# Patient Record
Sex: FEMALE | Race: WHITE | Employment: FULL TIME | URBAN - METROPOLITAN AREA
[De-identification: names, ages, dates, MRNs, and addresses within clinical notes are randomized per-mention and may not be internally consistent; named-entity substitution may affect disease eponyms.]

---

## 2021-04-23 ENCOUNTER — TELEPHONE (OUTPATIENT)
Dept: ORTHOPEDIC SURGERY | Age: 61
End: 2021-04-23

## 2021-04-23 NOTE — TELEPHONE ENCOUNTER
Called Dr. kAhil Maguire office ([facility]) and left message stating this patient would like to establish care with Dr. Shanae Fortune. Apparently records have been faxed 3 times, but there is nothing scanned in, yet, and nothing in our previous fax baskets. Left office contact information, including two fax numbers, nurse direct number, general office number, and nurse direct secure email address.

## 2021-04-23 NOTE — TELEPHONE ENCOUNTER
Patient had a knee surgery with DR Rockwell and wants to establish care with Dr Magen Neely for ongoing knee pain. Per patient [facility] office told her they have faxed her records to our office 3x. I did verify fax # with patient. She is requesting a return call once we receive her records.    Thank you

## 2021-04-26 NOTE — TELEPHONE ENCOUNTER
Patient dropped off referral and medical documents will have  review 4/27/21. Then will up date the patient once  reviews.

## 2021-05-07 DIAGNOSIS — M25.561 ACUTE PAIN OF RIGHT KNEE: Primary | ICD-10-CM

## 2021-05-11 ENCOUNTER — OFFICE VISIT (OUTPATIENT)
Dept: ORTHOPEDIC SURGERY | Age: 61
End: 2021-05-11
Payer: COMMERCIAL

## 2021-05-11 DIAGNOSIS — M25.561 ACUTE PAIN OF RIGHT KNEE: Primary | ICD-10-CM

## 2021-05-11 DIAGNOSIS — M17.11 ARTHRITIS OF RIGHT KNEE: ICD-10-CM

## 2021-05-11 PROCEDURE — 99203 OFFICE O/P NEW LOW 30 MIN: CPT | Performed by: ORTHOPAEDIC SURGERY

## 2021-05-11 NOTE — PROGRESS NOTES
Social History Narrative    Not on file     No past medical history on file. No past surgical history on file. No family history on file. Physical Exam:  Vitals signs and nursing note reviewed. Constitutional:       Appearance: She is well-developed. HENT:      Head: Normocephalic and atraumatic. Nose: Nose normal.   Eyes:      Conjunctiva/sclera: Conjunctivae normal.   Neck:      Musculoskeletal: Normal range of motion and neck supple. Pulmonary:      Effort: Pulmonary effort is normal. No respiratory distress. Musculoskeletal:      Comments: Normal gait     Skin:     General: Skin is warm and dry. Neurological:      Mental Status: She is alert and oriented to person, place, and time. Sensory: No sensory deficit. Psychiatric:         Behavior: Behavior normal.         Thought Content: Thought content normal.  Patient complains of medial right knee pain no obvious effusion normal range of motion good stability previous arthroscopic scars      Diagnostic imaging:    Diagnostic imaging AP standing bilateral knees lateral right knee patient with bone-on-bone medial compartment right knee normal congruent/smoothness of bone is lost endplate sclerosis marginal osteophytes    Assessment and Plan:  1. Acute pain of right knee      Patient with severe right knee osteoarthritis    She is a candidate for surgery although she is a current smoker    Explained to patient that she must quit smoking before we proceed with RTKA    Chantix prescribed    Follow up in 4 weeks      Provider Attestation:  Khurram Gill, personally performed the services described in this documentation. All medical record entries made by the scribe were at my direction and in my presence. I have reviewed the chart and discharge instructions and agree that the records reflect my personal performance and is accurate and complete. Chano Snell MD 5/11/21     Scribe Attestation:  By signing my name below, Ophelia Davis Karyn, attest that this documentation has been prepared under the direction and in the presence of Dr. Eduardo Gan. Electronically signed: Erick Suresh, 5/11/21     Please note that this chart was generated using voice recognition Dragon dictation software. Although every effort was made to ensure the accuracy of this automated transcription, some errors in transcription may have occurred.

## 2021-06-08 ENCOUNTER — OFFICE VISIT (OUTPATIENT)
Dept: ORTHOPEDIC SURGERY | Age: 61
End: 2021-06-08
Payer: COMMERCIAL

## 2021-06-08 VITALS — WEIGHT: 155 LBS | HEART RATE: 90 BPM | BODY MASS INDEX: 28.52 KG/M2 | HEIGHT: 62 IN

## 2021-06-08 DIAGNOSIS — M25.561 ACUTE PAIN OF RIGHT KNEE: Primary | ICD-10-CM

## 2021-06-08 DIAGNOSIS — M17.11 ARTHRITIS OF RIGHT KNEE: ICD-10-CM

## 2021-06-08 PROCEDURE — 99213 OFFICE O/P EST LOW 20 MIN: CPT | Performed by: ORTHOPAEDIC SURGERY

## 2021-06-08 NOTE — PROGRESS NOTES
Patient ID: Selma Mayo is a 63 y.o. female    Chief Compliant:  No chief complaint on file. HPI:  This is a 63 y.o. female who presents to the clinic today for right knee pain    She continues to have severe medial right knee pain     She reports she has completely quit nicotine products since the last visit    Review of Systems   All other systems reviewed and are negative. Past History:  No current outpatient medications on file. No Known Allergies  Social History     Socioeconomic History    Marital status:      Spouse name: Not on file    Number of children: Not on file    Years of education: Not on file    Highest education level: Not on file   Occupational History    Not on file   Tobacco Use    Smoking status: Not on file   Substance and Sexual Activity    Alcohol use: Not on file    Drug use: Not on file    Sexual activity: Not on file   Other Topics Concern    Not on file   Social History Narrative    Not on file     Social Determinants of Health     Financial Resource Strain:     Difficulty of Paying Living Expenses:    Food Insecurity:     Worried About Running Out of Food in the Last Year:     [address] in the Last Year:    Transportation Needs:     Lack of Transportation (Medical):  Lack of Transportation (Non-Medical):    Physical Activity:     Days of Exercise per Week:     Minutes of Exercise per Session:    Stress:     Feeling of Stress :    Social Connections:     Frequency of Communication with Friends and Family:     Frequency of Social Gatherings with Friends and Family:     Attends Church Services:     Active Member of Clubs or Organizations:     Attends Club or Organization Meetings:     Marital Status:    Intimate Partner Violence:     Fear of Current or Ex-Partner:     Emotionally Abused:     Physically Abused:     Sexually Abused:      No past medical history on file. No past surgical history on file.   No family history on file.     Physical Exam:  Vitals signs and nursing note reviewed. Constitutional:       Appearance: She is well-developed. HENT:      Head: Normocephalic and atraumatic. Nose: Nose normal.   Eyes:      Conjunctiva/sclera: Conjunctivae normal.   Neck:      Musculoskeletal: Normal range of motion and neck supple. Pulmonary:      Effort: Pulmonary effort is normal. No respiratory distress. Musculoskeletal:      Comments: Normal gait     Skin:     General: Skin is warm and dry. Neurological:      Mental Status: She is alert and oriented to person, place, and time. Sensory: No sensory deficit. Psychiatric:         Behavior: Behavior normal.         Thought Content: Thought content normal.        Diagnostic imaging:  Diagnostic imaging AP standing bilateral knees lateral right knee patient with bone-on-bone medial compartment right knee normal congruent/smoothness of bone is lost endplate sclerosis marginal osteophytes    Assessment and Plan:  1. Acute pain of right knee    2. Arthritis of right knee      Patient with severe right knee osteoarthritis    We will proceed with RTKA, she has completely quit nicotine products    We discussed risks of surgery and recovery process. Risks include infection, bleeding, neurologic injury, systemic and anesthetic complications, no relief of pain, need for future surgery. Follow up 2 weeks after surgery    Provider Attestation:  Orlando Gill, personally performed the services described in this documentation. All medical record entries made by the scribe were at my direction and in my presence. I have reviewed the chart and discharge instructions and agree that the records reflect my personal performance and is accurate and complete. Jimmy Barba MD 6/8/21     Scribe Attestation:  By signing my name below, Bailey Hancock, attest that this documentation has been prepared under the direction and in the presence of Dr. Anya Vásquez.  Electronically signed:

## 2021-07-06 ENCOUNTER — TELEPHONE (OUTPATIENT)
Dept: ORTHOPEDIC SURGERY | Age: 61
End: 2021-07-06

## 2021-07-06 NOTE — TELEPHONE ENCOUNTER
Kt Born from [facility] PT called stating he evaluated patient on Sunday, she is scheduled for PT through 7/17/21.

## 2021-07-06 NOTE — TELEPHONE ENCOUNTER
Taina Marti OT  Stated she evaluated patient and will have a follow up X1 for education. She verified that patient is not to shower until her PO appointment on 7/13/21, she voiced understanding to hold off on the shower until appointment.

## 2021-07-13 ENCOUNTER — TELEPHONE (OUTPATIENT)
Dept: ORTHOPEDIC SURGERY | Age: 61
End: 2021-07-13

## 2021-07-13 ENCOUNTER — OFFICE VISIT (OUTPATIENT)
Dept: ORTHOPEDIC SURGERY | Age: 61
End: 2021-07-13

## 2021-07-13 VITALS — WEIGHT: 152 LBS | BODY MASS INDEX: 26.93 KG/M2 | RESPIRATION RATE: 12 BRPM | HEIGHT: 63 IN

## 2021-07-13 DIAGNOSIS — Z96.651 STATUS POST TOTAL RIGHT KNEE REPLACEMENT: Primary | ICD-10-CM

## 2021-07-13 PROCEDURE — 99024 POSTOP FOLLOW-UP VISIT: CPT | Performed by: ORTHOPAEDIC SURGERY

## 2021-07-13 RX ORDER — ASPIRIN 81 MG/1
81 TABLET ORAL 2 TIMES DAILY
COMMUNITY
Start: 2021-06-30 | End: 2021-08-10

## 2021-07-13 RX ORDER — OXYCODONE AND ACETAMINOPHEN 10; 325 MG/1; MG/1
1 TABLET ORAL EVERY 4 HOURS PRN
COMMUNITY

## 2021-07-13 RX ORDER — VARENICLINE TARTRATE 1 MG/1
1 TABLET, FILM COATED ORAL 2 TIMES DAILY
Qty: 60 TABLET | Refills: 5 | Status: SHIPPED | OUTPATIENT
Start: 2021-07-13

## 2021-07-13 NOTE — TELEPHONE ENCOUNTER
Kev Byrne from [facility] is calling to advise Dr Nichole Greco that Ann-Mariecheri Estephania has been released from home care. She will continue with physical therapy. If he has any questions, he may reach Kev Byrne @ [phone]. Thank you.

## 2021-07-13 NOTE — PROGRESS NOTES
Patient ID: Henrry Almaguer is a 57 y.o. female    Chief Compliant:  Chief Complaint   Patient presents with    Post-Op Check     Rt TKA 21        HPI:  This is a 57 y.o. female who presents to the clinic today for right TKA on 2021. Patient notes that she is doing well post-op    She has been walking frequently and working on her ROM    She has been doing at-home PT. No denies weakness, pain, or paraesthesias      Review of Systems   All other systems reviewed and are negative. Past History:    Current Outpatient Medications:     aspirin 81 MG EC tablet, Take 81 mg by mouth 2 times daily, Disp: , Rfl:     oxyCODONE-acetaminophen (PERCOCET)  MG per tablet, Take 1 tablet by mouth every 4 hours as needed. , Disp: , Rfl:   No Known Allergies  Social History     Socioeconomic History    Marital status:      Spouse name: Not on file    Number of children: Not on file    Years of education: Not on file    Highest education level: Not on file   Occupational History    Not on file   Tobacco Use    Smoking status: Former Smoker     Quit date: 2021     Years since quittin.1    Smokeless tobacco: Never Used   Substance and Sexual Activity    Alcohol use: Not on file    Drug use: Not on file    Sexual activity: Not on file   Other Topics Concern    Not on file   Social History Narrative    Not on file     Social Determinants of Health     Financial Resource Strain:     Difficulty of Paying Living Expenses:    Food Insecurity:     Worried About [address] in the Last Year:     [address] in the Last Year:    Transportation Needs:     Lack of Transportation (Medical):      Lack of Transportation (Non-Medical):    Physical Activity:     Days of Exercise per Week:     Minutes of Exercise per Session:    Stress:     Feeling of Stress :    Social Connections:     Frequency of Communication with Friends and Family:     Frequency of Social Gatherings with below, I, Denise, attest that this documentation has been prepared under the direction and in the presence of Dr. Artemio Raymond. Electronically signed: Erick Walker, 7/13/21     Please note that this chart was generated using voice recognition Dragon dictation software. Although every effort was made to ensure the accuracy of this automated transcription, some errors in transcription may have occurred.

## 2021-08-10 ENCOUNTER — OFFICE VISIT (OUTPATIENT)
Dept: ORTHOPEDIC SURGERY | Age: 61
End: 2021-08-10

## 2021-08-10 VITALS — RESPIRATION RATE: 18 BRPM | HEIGHT: 62 IN | BODY MASS INDEX: 27.97 KG/M2 | WEIGHT: 152 LBS

## 2021-08-10 DIAGNOSIS — Z96.651 STATUS POST TOTAL RIGHT KNEE REPLACEMENT: Primary | ICD-10-CM

## 2021-08-10 PROCEDURE — 99024 POSTOP FOLLOW-UP VISIT: CPT | Performed by: ORTHOPAEDIC SURGERY

## 2021-08-10 NOTE — PROGRESS NOTES
Patient ID: Ever Mattson is a 57 y.o. female    Chief Compliant:  No chief complaint on file. Diagnostic imaging:        Assessment and Plan:  1. Status post total right knee replacement      Patient 6 weeks post RTKA with good relief of right knee pain    Follow up in     HPI:  This is a 57 y.o. female who presents to the clinic today 6 weeks post right TKA on 2021. Patient continues to recover well post surgery with good relief of knee pain. She is able to walk a mile without pain    Review of Systems   All other systems reviewed and are negative. Past History:    Current Outpatient Medications:     aspirin 81 MG EC tablet, Take 81 mg by mouth 2 times daily, Disp: , Rfl:     oxyCODONE-acetaminophen (PERCOCET)  MG per tablet, Take 1 tablet by mouth every 4 hours as needed. , Disp: , Rfl:     varenicline (CHANTIX) 1 MG tablet, Take 1 tablet by mouth 2 times daily, Disp: 60 tablet, Rfl: 5  No Known Allergies  Social History     Socioeconomic History    Marital status:      Spouse name: Not on file    Number of children: Not on file    Years of education: Not on file    Highest education level: Not on file   Occupational History    Not on file   Tobacco Use    Smoking status: Former Smoker     Quit date: 2021     Years since quittin.2    Smokeless tobacco: Never Used   Substance and Sexual Activity    Alcohol use: Not on file    Drug use: Not on file    Sexual activity: Not on file   Other Topics Concern    Not on file   Social History Narrative    Not on file     Social Determinants of Health     Financial Resource Strain:     Difficulty of Paying Living Expenses:    Food Insecurity:     Worried About [address] in the Last Year:     [address] in the Last Year:    Transportation Needs:     Lack of Transportation (Medical):      Lack of Transportation (Non-Medical):    Physical Activity:     Days of Exercise per Week:     Minutes of Exercise per Session:    Stress:     Feeling of Stress :    Social Connections:     Frequency of Communication with Friends and Family:     Frequency of Social Gatherings with Friends and Family:     Attends Protestant Services:     Active Member of Clubs or Organizations:     Attends Club or Organization Meetings:     Marital Status:    Intimate Partner Violence:     Fear of Current or Ex-Partner:     Emotionally Abused:     Physically Abused:     Sexually Abused:      No past medical history on file. No past surgical history on file. No family history on file. Physical Exam:  Vitals signs and nursing note reviewed. Constitutional:       Appearance: well-developed. HENT:      Head: Normocephalic and atraumatic. Nose: Nose normal.   Eyes:      Conjunctiva/sclera: Conjunctivae normal.   Neck:      Musculoskeletal: Normal range of motion and neck supple. Pulmonary:      Effort: Pulmonary effort is normal. No respiratory distress. Musculoskeletal:      Comments: Normal gait     Skin:     General: Skin is warm and dry. Neurological:      Mental Status: Alert and oriented to person, place, and time. Sensory: No sensory deficit. Psychiatric:         Behavior: Behavior normal.         Thought Content: Thought content normal.    Range of motion 0-1 30 excellent stability    Provider Attestation:  Shelley Gill personally performed the services described in this documentation. All medical record entries made by the scribe were at my direction and in my presence. I have reviewed the chart and discharge instructions and agree that the records reflect my personal performance and is accurate and complete. Ingrid Mcclendon MD 8/10/21     Scribe Attestation:  By signing my name below, Kirstin Rowena, attest that this documentation has been prepared under the direction and in the presence of Dr. Kashif Maynard.  Electronically signed: Erick Pina, 8/10/21     Please note that this chart was generated using voice recognition Dragon dictation software. Although every effort was made to ensure the accuracy of this automated transcription, some errors in transcription may have occurred.

## 2022-06-07 ENCOUNTER — OFFICE VISIT (OUTPATIENT)
Dept: ORTHOPEDIC SURGERY | Age: 62
End: 2022-06-07
Payer: COMMERCIAL

## 2022-06-07 VITALS — WEIGHT: 152 LBS | RESPIRATION RATE: 14 BRPM | HEIGHT: 62 IN | BODY MASS INDEX: 27.97 KG/M2

## 2022-06-07 DIAGNOSIS — Z96.651 STATUS POST TOTAL RIGHT KNEE REPLACEMENT: Primary | ICD-10-CM

## 2022-06-07 PROCEDURE — 99213 OFFICE O/P EST LOW 20 MIN: CPT | Performed by: ORTHOPAEDIC SURGERY

## 2022-06-07 RX ORDER — ALBUTEROL SULFATE 90 UG/1
2 AEROSOL, METERED RESPIRATORY (INHALATION) EVERY 6 HOURS PRN
COMMUNITY
Start: 2021-09-17

## 2022-06-07 NOTE — PROGRESS NOTES
Patient ID: Oscar Laird is a 57 y.o. female    Chief Compliant:  Chief Complaint   Patient presents with    Knee Pain     Right        Diagnostic imaging:    AP standing bilateral knees lateral right knee status post right total knee arthroplasty fairly minimal degenerative changes left knee    Assessment and Plan:  1. Status post total right knee replacement      1 year post R TKA knee pain remains improved and she is able to work and be active without pain    Follow up prn    HPI:  This is a 57 y.o. female who presents to the clinic today for 1 year post R TKA 21. Patient notes ongoing relief of right knee pain post operatively. Patient is able to work and be active without pain. She continues to walk regularly. Review of Systems   All other systems reviewed and are negative. Past History:    Current Outpatient Medications:     aspirin 81 MG EC tablet, Take 81 mg by mouth 2 times daily, Disp: , Rfl:     oxyCODONE-acetaminophen (PERCOCET)  MG per tablet, Take 1 tablet by mouth every 4 hours as needed.  (Patient not taking: Reported on 8/10/2021), Disp: , Rfl:     varenicline (CHANTIX) 1 MG tablet, Take 1 tablet by mouth 2 times daily (Patient not taking: Reported on 8/10/2021), Disp: 60 tablet, Rfl: 5  No Known Allergies  Social History     Socioeconomic History    Marital status:      Spouse name: Not on file    Number of children: Not on file    Years of education: Not on file    Highest education level: Not on file   Occupational History    Not on file   Tobacco Use    Smoking status: Former Smoker     Quit date: 2021     Years since quittin.0    Smokeless tobacco: Never Used   Substance and Sexual Activity    Alcohol use: Not on file    Drug use: Not on file    Sexual activity: Not on file   Other Topics Concern    Not on file   Social History Narrative    Not on file     Social Determinants of Health     Financial Resource Strain:     Difficulty of Paying Living Expenses: Not on file   Food Insecurity:     Worried About Running Out of Food in the Last Year: Not on file    Ran Out of Food in the Last Year: Not on file   Transportation Needs:     Lack of Transportation (Medical): Not on file    Lack of Transportation (Non-Medical): Not on file   Physical Activity:     Days of Exercise per Week: Not on file    Minutes of Exercise per Session: Not on file   Stress:     Feeling of Stress : Not on file   Social Connections:     Frequency of Communication with Friends and Family: Not on file    Frequency of Social Gatherings with Friends and Family: Not on file    Attends Denominational Services: Not on file    Active Member of [address] [place] [address] [employer] or Organizations: Not on file    Attends Club or Organization Meetings: Not on file    Marital Status: Not on file   Intimate Partner Violence:     Fear of Current or Ex-Partner: Not on file    Emotionally Abused: Not on file    Physically Abused: Not on file    Sexually Abused: Not on file   Housing Stability:     Unable to Pay for Housing in the Last Year: Not on file    Number of [place] in the Last Year: Not on file    Unstable Housing in the Last Year: Not on file     No past medical history on file. No past surgical history on file. No family history on file. Physical Exam:  Vitals signs and nursing note reviewed. Constitutional:       Appearance: well-developed. HENT:      Head: Normocephalic and atraumatic. Nose: Nose normal.   Eyes:      Conjunctiva/sclera: Conjunctivae normal.   Neck:      Musculoskeletal: Normal range of motion and neck supple. Pulmonary:      Effort: Pulmonary effort is normal. No respiratory distress. Musculoskeletal:      Comments: Normal gait     Skin:     General: Skin is warm and dry. Neurological:      Mental Status: Alert and oriented to person, place, and time. Sensory: No sensory deficit.    Psychiatric:         Behavior: Behavior normal.         Thought Content: Thought content normal.        Provider Attestation:  Elise Gill, personally performed the services described in this documentation. All medical record entries made by the scribe were at my direction and in my presence. I have reviewed the chart and discharge instructions and agree that the records reflect my personal performance and is accurate and complete. Vernon Benavidez MD 6/7/22       Scribe Attestation:  By signing my name below, Salvador Tracy, attest that this documentation has been prepared under the direction and in the presence of Dr. Karolina Limon. Electronically signed: Erick Del Toro, 6/7/22     Please note that this chart was generated using voice recognition Dragon dictation software. Although every effort was made to ensure the accuracy of this automated transcription, some errors in transcription may have occurred.